# Patient Record
Sex: MALE | Race: WHITE | Employment: FULL TIME | URBAN - METROPOLITAN AREA
[De-identification: names, ages, dates, MRNs, and addresses within clinical notes are randomized per-mention and may not be internally consistent; named-entity substitution may affect disease eponyms.]

---

## 2021-05-04 ENCOUNTER — HOSPITAL ENCOUNTER (OUTPATIENT)
Dept: LAB | Age: 36
Discharge: HOME | End: 2021-05-04
Payer: COMMERCIAL

## 2021-05-04 DIAGNOSIS — C95.90 LEUKEMIA NOT HAVING ACHIEVED REMISSION, UNSPECIFIED LEUKEMIA TYPE (HCC): ICD-10-CM

## 2021-05-04 PROBLEM — L65.9 ALOPECIA: Status: ACTIVE | Noted: 2021-05-04

## 2021-05-04 PROBLEM — R74.01 TRANSAMINITIS: Status: ACTIVE | Noted: 2021-05-04

## 2021-05-04 PROBLEM — D84.9 IMMUNOSUPPRESSION (HCC): Status: ACTIVE | Noted: 2021-05-04

## 2021-05-04 PROBLEM — C91.01 ALL (ACUTE LYMPHOID LEUKEMIA) IN REMISSION (HCC): Status: ACTIVE | Noted: 2021-05-04

## 2021-05-04 LAB
ABO + RH BLD: NORMAL
ALBUMIN SERPL-MCNC: 3.3 G/DL (ref 3.5–5)
ALBUMIN/GLOB SERPL: 1.4 {RATIO} (ref 1.2–3.5)
ALP SERPL-CCNC: 116 U/L (ref 50–136)
ALT SERPL-CCNC: 208 U/L (ref 12–65)
ANION GAP SERPL CALC-SCNC: 6 MMOL/L (ref 7–16)
AST SERPL-CCNC: 86 U/L (ref 15–37)
BASOPHILS # BLD: 0 K/UL (ref 0–0.2)
BASOPHILS NFR BLD: 1 % (ref 0–2)
BILIRUB SERPL-MCNC: 0.5 MG/DL (ref 0.2–1.1)
BLOOD GROUP ANTIBODIES SERPL: NORMAL
BUN SERPL-MCNC: 20 MG/DL (ref 6–23)
CALCIUM SERPL-MCNC: 8.7 MG/DL (ref 8.3–10.4)
CHLORIDE SERPL-SCNC: 106 MMOL/L (ref 98–107)
CO2 SERPL-SCNC: 29 MMOL/L (ref 21–32)
CREAT SERPL-MCNC: 0.8 MG/DL (ref 0.8–1.5)
DIFFERENTIAL METHOD BLD: ABNORMAL
EOSINOPHIL # BLD: 0 K/UL (ref 0–0.8)
EOSINOPHIL NFR BLD: 0 % (ref 0.5–7.8)
ERYTHROCYTE [DISTWIDTH] IN BLOOD BY AUTOMATED COUNT: 13.9 % (ref 11.9–14.6)
GLOBULIN SER CALC-MCNC: 2.3 G/DL (ref 2.3–3.5)
GLUCOSE SERPL-MCNC: 104 MG/DL (ref 65–100)
HCT VFR BLD AUTO: 31.7 %
HGB BLD-MCNC: 10.6 G/DL (ref 13.6–17.2)
IMM GRANULOCYTES # BLD AUTO: 0.1 K/UL (ref 0–0.5)
IMM GRANULOCYTES NFR BLD AUTO: 5 % (ref 0–5)
LDH SERPL L TO P-CCNC: 165 U/L (ref 100–190)
LYMPHOCYTES # BLD: 0.5 K/UL (ref 0.5–4.6)
LYMPHOCYTES NFR BLD: 33 % (ref 13–44)
MAGNESIUM SERPL-MCNC: 1.9 MG/DL (ref 1.8–2.4)
MCH RBC QN AUTO: 30.8 PG (ref 26.1–32.9)
MCHC RBC AUTO-ENTMCNC: 33.4 G/DL (ref 31.4–35)
MCV RBC AUTO: 92.2 FL (ref 79.6–97.8)
MONOCYTES # BLD: 0.3 K/UL (ref 0.1–1.3)
MONOCYTES NFR BLD: 22 % (ref 4–12)
NEUTS SEG # BLD: 0.6 K/UL (ref 1.7–8.2)
NEUTS SEG NFR BLD: 40 % (ref 43–78)
NRBC # BLD: 0 K/UL (ref 0–0.2)
PERIPHERAL SMEAR,PSM: NORMAL
PLATELET # BLD AUTO: 380 K/UL (ref 150–450)
PMV BLD AUTO: 9.3 FL (ref 9.4–12.3)
POTASSIUM SERPL-SCNC: 4 MMOL/L (ref 3.5–5.1)
PROT SERPL-MCNC: 5.6 G/DL (ref 6.3–8.2)
RBC # BLD AUTO: 3.44 M/UL (ref 4.23–5.6)
SODIUM SERPL-SCNC: 141 MMOL/L (ref 136–145)
SPECIMEN EXP DATE BLD: NORMAL
WBC # BLD AUTO: 1.6 K/UL (ref 4.3–11.1)

## 2021-05-04 PROCEDURE — 83615 LACTATE (LD) (LDH) ENZYME: CPT

## 2021-05-04 PROCEDURE — 86901 BLOOD TYPING SEROLOGIC RH(D): CPT

## 2021-05-04 PROCEDURE — 85025 COMPLETE CBC W/AUTO DIFF WBC: CPT

## 2021-05-04 PROCEDURE — 83735 ASSAY OF MAGNESIUM: CPT

## 2021-05-04 PROCEDURE — 80053 COMPREHEN METABOLIC PANEL: CPT

## 2021-05-04 PROCEDURE — 36415 COLL VENOUS BLD VENIPUNCTURE: CPT

## 2021-05-05 ENCOUNTER — PATIENT OUTREACH (OUTPATIENT)
Dept: CASE MANAGEMENT | Age: 36
End: 2021-05-05

## 2021-05-05 NOTE — PROGRESS NOTES
5/4/21 - Patient here for New Patient visit with Dr. Ekta Bhatia. Patient is currently undergoing treatment at [facility] [address] [facility] at [facility] but may need services closer to home and is here to establish care with us today. Patient has no needs at this time. Contact information provided and patient is aware we are here for whatever needs we can assist with. JANET sent to [facility] to obtain records. Patient and wife with very good understanding of treatment received so far. Patient given information on [employer] to see if they can assist oldest son (age 4.5) with some anxiety and questions about disease process and treatment. No other needs noted.

## 2021-07-06 ENCOUNTER — HOSPITAL ENCOUNTER (OUTPATIENT)
Dept: GENERAL RADIOLOGY | Age: 36
Discharge: HOME | End: 2021-07-06

## 2021-07-06 DIAGNOSIS — C91.01 ALL (ACUTE LYMPHOID LEUKEMIA) IN REMISSION (HCC): ICD-10-CM

## 2021-07-09 ENCOUNTER — HOSPITAL ENCOUNTER (OUTPATIENT)
Dept: INTERVENTIONAL RADIOLOGY/VASCULAR | Age: 36
Discharge: HOME | End: 2021-07-09
Attending: PEDIATRICS
Payer: COMMERCIAL

## 2021-07-09 DIAGNOSIS — C91.01 ALL (ACUTE LYMPHOID LEUKEMIA) IN REMISSION (HCC): ICD-10-CM

## 2021-07-09 PROCEDURE — 36589 REMOVAL TUNNELED CV CATH: CPT

## 2021-07-09 NOTE — DISCHARGE INSTRUCTIONS
Nayani [address]  Department of Interventional Radiology  [facility]  [phone] Office  [phone] Fax    DRAIN/PORT/CATHETER REMOVAL  DISCHARGE INSTRUCTIONS    General Information:     Your doctor has ordered for us to remove your drain, port, or catheter. This could be that you do not need it anymore, it is not doing its job, your physician has decided on another plan for your treatment and/or it may need replacing. Home Care Instructions: You can resume your regular diet and medication regimen. Do not drink alcohol, drive, or make any important legal decisions in the next 24 hours. Do not lift anything heavier than a gallon of milk, or do anything strenuous for the next 24 hours. You will notice a dressing over the site of the removal. This dressing should stay in place until the site is healed. The dressing should be changed at least every 48 hours. You should change the dressing sooner if it becomes soiled or wet. The nurse who discharges you to home should review with you any wound care instructions. Resume your normal level of activity slowly. You may shower after 24 hours, but do not take a bath, swim or immerse yourself in water until the site has healed, and keep the dressing dry with plastic wrap while showering. The site may ooze for a couple of days. This drainage should lessen with each passing day. Call If:     You should call your Physician and/or the Radiology Nurse if you have any bleeding other than a small spot on your bandage. Call if you have any signs of infection, fever, or increased pain at the site of the tube. Call if the oozing increases, if it changes color, or begins to have an odor. Follow-Up Instructions: Please see your ordering doctor as he/she has requested. To Reach Us: If you have any questions about your procedure, please call the Interventional Radiology department at [phone].  After business hours (5pm) and weekends, call the answering service at [phone] and ask for the Radiologist on call to be paged. Interventional Radiology General Nurse Discharge    After general anesthesia or intravenous sedation, for 24 hours or while taking prescription Narcotics:  · Limit your activities  · Do not drive and operate hazardous machinery  · Do not make important personal or business decisions  · Do  not drink alcoholic beverages  · If you have not urinated within 8 hours after discharge, please contact your surgeon on call. * Please give a list of your current medications to your Primary Care Provider. * Please update this list whenever your medications are discontinued, doses are     changed, or new medications (including over-the-counter products) are added. * Please carry medication information at all times in case of emergency situations. These are general instructions for a healthy lifestyle:    No smoking/ No tobacco products/ Avoid exposure to second hand smoke  Surgeon General's Warning:  Quitting smoking now greatly reduces serious risk to your health. Obesity, smoking, and sedentary lifestyle greatly increases your risk for illness  A healthy diet, regular physical exercise & weight monitoring are important for maintaining a healthy lifestyle    You may be retaining fluid if you have a history of heart failure or if you experience any of the following symptoms:  Weight gain of 3 pounds or more overnight or 5 pounds in a week, increased swelling in our hands or feet or shortness of breath while lying flat in bed. Please call your doctor as soon as you notice any of these symptoms; do not wait until your next office visit.     Recognize signs and symptoms of STROKE:  F-face looks uneven    A-arms unable to move or move unevenly    S-speech slurred or non-existent    T-time-call 911 as soon as signs and symptoms begin-DO NOT go       Back to bed or wait to see if you get better-TIME IS BRAIN.           Patient Signature:  Date: 7/9/2021  Discharging Nurse: Joe Han

## 2021-07-12 ENCOUNTER — HOSPITAL ENCOUNTER (OUTPATIENT)
Dept: LAB | Age: 36
Discharge: HOME | End: 2021-07-12
Payer: COMMERCIAL

## 2021-07-12 DIAGNOSIS — C91.01 ALL (ACUTE LYMPHOID LEUKEMIA) IN REMISSION (HCC): ICD-10-CM

## 2021-07-12 LAB
ALBUMIN SERPL-MCNC: 3.5 G/DL (ref 3.5–5)
ALBUMIN/GLOB SERPL: 1.3 {RATIO} (ref 1.2–3.5)
ALP SERPL-CCNC: 49 U/L (ref 50–136)
ALT SERPL-CCNC: 50 U/L (ref 12–65)
ANION GAP SERPL CALC-SCNC: 4 MMOL/L (ref 7–16)
AST SERPL-CCNC: 29 U/L (ref 15–37)
BASOPHILS # BLD: 0 K/UL (ref 0–0.2)
BASOPHILS NFR BLD: 1 % (ref 0–2)
BILIRUB SERPL-MCNC: 0.4 MG/DL (ref 0.2–1.1)
BUN SERPL-MCNC: 16 MG/DL (ref 6–23)
CALCIUM SERPL-MCNC: 9.3 MG/DL (ref 8.3–10.4)
CHLORIDE SERPL-SCNC: 107 MMOL/L (ref 98–107)
CO2 SERPL-SCNC: 31 MMOL/L (ref 21–32)
CREAT SERPL-MCNC: 0.8 MG/DL (ref 0.8–1.5)
DIFFERENTIAL METHOD BLD: ABNORMAL
EOSINOPHIL # BLD: 0.6 K/UL (ref 0–0.8)
EOSINOPHIL NFR BLD: 13 % (ref 0.5–7.8)
ERYTHROCYTE [DISTWIDTH] IN BLOOD BY AUTOMATED COUNT: 20.3 % (ref 11.9–14.6)
GLOBULIN SER CALC-MCNC: 2.8 G/DL (ref 2.3–3.5)
GLUCOSE SERPL-MCNC: 138 MG/DL (ref 65–100)
HCT VFR BLD AUTO: 31.9 %
HGB BLD-MCNC: 10.2 G/DL (ref 13.6–17.2)
IMM GRANULOCYTES # BLD AUTO: 0 K/UL (ref 0–0.5)
IMM GRANULOCYTES NFR BLD AUTO: 1 % (ref 0–5)
LDH SERPL L TO P-CCNC: 139 U/L (ref 100–190)
LYMPHOCYTES # BLD: 0.9 K/UL (ref 0.5–4.6)
LYMPHOCYTES NFR BLD: 21 % (ref 13–44)
MAGNESIUM SERPL-MCNC: 2.1 MG/DL (ref 1.8–2.4)
MCH RBC QN AUTO: 31.4 PG (ref 26.1–32.9)
MCHC RBC AUTO-ENTMCNC: 32 G/DL (ref 31.4–35)
MCV RBC AUTO: 98.2 FL (ref 79.6–97.8)
MONOCYTES # BLD: 0.7 K/UL (ref 0.1–1.3)
MONOCYTES NFR BLD: 18 % (ref 4–12)
NEUTS SEG # BLD: 1.9 K/UL (ref 1.7–8.2)
NEUTS SEG NFR BLD: 47 % (ref 43–78)
NRBC # BLD: 0 K/UL (ref 0–0.2)
PLATELET # BLD AUTO: 179 K/UL (ref 150–450)
PMV BLD AUTO: 9.7 FL (ref 9.4–12.3)
POTASSIUM SERPL-SCNC: 3.9 MMOL/L (ref 3.5–5.1)
PROT SERPL-MCNC: 6.3 G/DL (ref 6.3–8.2)
RBC # BLD AUTO: 3.25 M/UL (ref 4.23–5.6)
SODIUM SERPL-SCNC: 142 MMOL/L (ref 136–145)
WBC # BLD AUTO: 4.1 K/UL (ref 4.3–11.1)

## 2021-07-12 PROCEDURE — 83735 ASSAY OF MAGNESIUM: CPT

## 2021-07-12 PROCEDURE — 36415 COLL VENOUS BLD VENIPUNCTURE: CPT

## 2021-07-12 PROCEDURE — 85025 COMPLETE CBC W/AUTO DIFF WBC: CPT

## 2021-07-12 PROCEDURE — 83615 LACTATE (LD) (LDH) ENZYME: CPT

## 2021-07-12 PROCEDURE — 80053 COMPREHEN METABOLIC PANEL: CPT
